# Patient Record
Sex: FEMALE | Race: WHITE | NOT HISPANIC OR LATINO | Employment: OTHER | URBAN - METROPOLITAN AREA
[De-identification: names, ages, dates, MRNs, and addresses within clinical notes are randomized per-mention and may not be internally consistent; named-entity substitution may affect disease eponyms.]

---

## 2022-10-26 ENCOUNTER — OFFICE VISIT (OUTPATIENT)
Dept: PODIATRY | Facility: CLINIC | Age: 77
End: 2022-10-26
Payer: MEDICARE

## 2022-10-26 VITALS
WEIGHT: 130 LBS | SYSTOLIC BLOOD PRESSURE: 150 MMHG | DIASTOLIC BLOOD PRESSURE: 80 MMHG | RESPIRATION RATE: 16 BRPM | BODY MASS INDEX: 25.52 KG/M2 | HEART RATE: 79 BPM | HEIGHT: 60 IN

## 2022-10-26 DIAGNOSIS — M20.11 HALLUX ABDUCTO VALGUS, BILATERAL: Primary | ICD-10-CM

## 2022-10-26 DIAGNOSIS — B35.1 ONYCHOMYCOSIS OF TOENAIL: ICD-10-CM

## 2022-10-26 DIAGNOSIS — E11.9 CONTROLLED TYPE 2 DIABETES MELLITUS WITHOUT COMPLICATION, UNSPECIFIED WHETHER LONG TERM INSULIN USE: ICD-10-CM

## 2022-10-26 DIAGNOSIS — M20.5X9 OVERRIDING TOE, UNSPECIFIED LATERALITY: ICD-10-CM

## 2022-10-26 DIAGNOSIS — M20.12 HALLUX ABDUCTO VALGUS, BILATERAL: Primary | ICD-10-CM

## 2022-10-26 PROCEDURE — 99999 PR PBB SHADOW E&M-NEW PATIENT-LVL V: ICD-10-PCS | Mod: PBBFAC,,, | Performed by: PODIATRIST

## 2022-10-26 PROCEDURE — 99999 PR PBB SHADOW E&M-NEW PATIENT-LVL V: CPT | Mod: PBBFAC,,, | Performed by: PODIATRIST

## 2022-10-26 PROCEDURE — 99202 OFFICE O/P NEW SF 15 MIN: CPT | Mod: S$PBB,,, | Performed by: PODIATRIST

## 2022-10-26 PROCEDURE — 99205 OFFICE O/P NEW HI 60 MIN: CPT | Mod: PBBFAC,PN | Performed by: PODIATRIST

## 2022-10-26 PROCEDURE — 99202 PR OFFICE/OUTPT VISIT, NEW, LEVL II, 15-29 MIN: ICD-10-PCS | Mod: S$PBB,,, | Performed by: PODIATRIST

## 2022-10-26 RX ORDER — AMLODIPINE BESYLATE 5 MG/1
TABLET ORAL
COMMUNITY
Start: 2022-01-18

## 2022-10-26 RX ORDER — ATORVASTATIN CALCIUM 20 MG/1
TABLET, FILM COATED ORAL
COMMUNITY
Start: 2022-01-19

## 2022-10-26 RX ORDER — SUCRALFATE 1 G/1
TABLET ORAL
COMMUNITY
Start: 2022-08-16

## 2022-10-26 RX ORDER — DOXEPIN HYDROCHLORIDE 10 MG/1
10 CAPSULE ORAL
COMMUNITY
Start: 2022-09-15

## 2022-10-26 RX ORDER — ENALAPRIL MALEATE 20 MG/1
TABLET ORAL
COMMUNITY
Start: 2022-08-07

## 2022-10-26 RX ORDER — TRAZODONE HYDROCHLORIDE 50 MG/1
TABLET ORAL
COMMUNITY
Start: 2021-11-29 | End: 2024-03-18

## 2022-10-26 RX ORDER — OMEPRAZOLE 40 MG/1
CAPSULE, DELAYED RELEASE ORAL
COMMUNITY
Start: 2022-08-16

## 2022-10-26 RX ORDER — PROGESTERONE 200 MG/1
CAPSULE ORAL
COMMUNITY
Start: 2022-05-25

## 2022-10-26 RX ORDER — ERGOCALCIFEROL 1.25 MG/1
CAPSULE ORAL
COMMUNITY
Start: 2022-06-20

## 2022-10-26 RX ORDER — ESTRADIOL 1 MG/1
TABLET ORAL
COMMUNITY
Start: 2022-05-25

## 2022-10-26 RX ORDER — QUININE SULFATE 324 MG/1
324 CAPSULE ORAL
COMMUNITY
Start: 2022-04-25 | End: 2023-04-20

## 2022-10-26 RX ORDER — HYOSCYAMINE SULFATE 0.125 MG
TABLET ORAL
COMMUNITY
Start: 2022-08-16

## 2022-10-26 RX ORDER — FAMOTIDINE 40 MG/1
40 TABLET, FILM COATED ORAL
COMMUNITY
Start: 2022-05-31 | End: 2023-05-26

## 2022-10-29 NOTE — PROGRESS NOTES
Subjective:       Patient ID: Dee Main is a 77 y.o. female.    Chief Complaint: Nail Problem, Cyst, and Diabetes Mellitus  Patient presents with complaint of a knot/cyst which hurts in closed toed shoes, problem with great toenail.  Relates area around the big toe joint outside of the left foot, is careful to try to wear shoes to rubbing in these areas.  Relates the end of the right great toenail is lose, lifted up, dark and pinching, no injury.    Past Medical History:   Diagnosis Date    Anxiety     Arthritis     Diabetes mellitus, type 2     GERD (gastroesophageal reflux disease)     Hyperlipidemia     Hypertension     Osteopenia     Restless legs syndrome (RLS)     Vitamin D deficiency      Past Surgical History:   Procedure Laterality Date    BREAST SURGERY      HAND SURGERY Bilateral     HYSTERECTOMY      JOINT REPLACEMENT      KNEE SURGERY Left     NECK SURGERY      SHOULDER SURGERY Bilateral      History reviewed. No pertinent family history.  Social History     Socioeconomic History    Marital status:    Tobacco Use    Smoking status: Former     Types: Cigarettes     Quit date:      Years since quittin.8    Smokeless tobacco: Never   Substance and Sexual Activity    Alcohol use: Not Currently    Drug use: Not Currently    Sexual activity: Not Currently       Current Outpatient Medications   Medication Sig Dispense Refill    amLODIPine (NORVASC) 5 MG tablet   See Instructions, TAKE ONE TABLET BY MOUTH DAILY, # 90 tab, 3 Refill(s), [facility]: [id number], 154.94, cm, 21 13:30:00 CST, Height/Length Measured, 62, kg, 21 13:30:00 CST, Weight Dosing      atorvastatin (LIPITOR) 20 MG tablet   See Instructions, TAKE ONE TABLET BY MOUTH DAILY, # 90 tab, 3 Refill(s), [facility]: [id number], 154.94, cm, 21 13:30:00 CST, Height/Length Measured, 62, kg, 21 13:30:00 CST, Weight Dosing      blood glucose control, high Soln   True Metrix Glucometer, See  Instructions, Test once daily Diagnosis is type II diabetes mellitus, E11.9, # 1 EA, 0 Refill(s), [id number], 154.94, cm, 11/29/21 13:30:00 CST, Height/Length Measured, 62, kg, 11/29/21 13:30:00...      blood sugar diagnostic Strp   True Metrix blood glucose strips, See Instructions, Test 1 time daily Diagnosis is type II diabetes mellitus, E11.9, # 100 EA, 3 Refill(s), [facility]: [id number], 154.94, cm, 08/16/22 14:10:00 CDT, Height/Length Measured, 58.9, kg, 08/16/22 14:1...      doxepin (SINEQUAN) 10 MG capsule 10 mg.      enalapril (VASOTEC) 20 MG tablet   See Instructions, TAKE TWO TABLETS BY MOUTH DAILY, # 180 tab, 3 Refill(s), Maintenance, [facility]: [id number], 154.94, cm, 07/28/22 13:44:00 CDT, Height/Length Measured, 58.4, kg, 07/28/22 13:44:00 CDT, Weight Dosing      ergocalciferol (ERGOCALCIFEROL) 50,000 unit Cap   See Instructions, TAKE ONE CAPSULE BY MOUTH WEEKLY, # 4 cap, 11 Refill(s), [facility]: [id number], 154.94, cm, 06/17/22 13:21:00 CDT, Height/Length Measured, 58.1, kg, 06/17/22 13:21:00 CDT, Weight Dosing      estradioL (ESTRACE) 1 MG tablet   See Instructions, TAKE ONE TABLET UNDER TONGUE AND LET DISSOLVE AT BEDTIME, # 90 tab, 3 Refill(s), [id number], 154.94, cm, 05/25/22 13:09:00 CDT, Height/Length Measured, 59.8, kg, 05/25/22 13:09:00 CDT, Weight Dosing      famotidine (PEPCID) 40 MG tablet 40 mg.      hyoscyamine (ANASPAZ,LEVSIN) 0.125 mg Tab   270 tab, 0 Refill(s), Soft Stop      LANCETS MISC   Lancets, See Instructions, Test once daily Diagnosis is type II diabetes mellitus, E11.9, # 100 EA, 3 Refill(s), Pharmacy: [id number], 154.94, cm, 11/29/21 13:30:00 CST, Height/Length Measured, 62, kg, 11/29/21 13:30:00 CST, Weight D...      omeprazole (PRILOSEC) 40 MG capsule   180 cap, 0 Refill(s), Soft Stop      progesterone (PROMETRIUM) 200 MG capsule   See Instructions, TAKE TWO CAPSULES BY MOUTH DAILY AT BEDTIME, # 180  "cap, 3 Refill(s), Maintenance, [facility]: [id number], 154.94, cm, 05/25/22 13:09:00 CDT, Height/Length Measured, 59.8, kg, 05/25/22 13:09:00 CDT, Weight Dosing      quiNINE sulfate 324 mg Cap 324 mg.      sucralfate (CARAFATE) 1 gram tablet   120 tab, 0 Refill(s), Soft Stop      traZODone (DESYREL) 50 MG tablet   See Instructions, TAKE ONE OR TWO TABLETS BY MOUTH AT BEDTIME AS NEEDED, # 60 tab, 11 Refill(s), [facility]: [id number], 154.94, cm, 11/29/21 13:30:00 CST, Height/Length Measured, 62, kg, 11/29/21 13:30:00 CST, Weight Dosing       No current facility-administered medications for this visit.     Review of patient's allergies indicates:   Allergen Reactions    Promethazine      Other reaction(s): Seizure    Hydrocodone      Other reaction(s): "I get loopy, crazy", Nausea       Review of Systems   HENT:  Negative for congestion.    Respiratory:  Negative for cough and shortness of breath.    Cardiovascular:  Negative for leg swelling.   All other systems reviewed and are negative.    Objective:      Vitals:    10/26/22 1026   BP: (!) 150/80   Pulse: 79   Resp: 16   Weight: 59 kg (130 lb)   Height: 5' (1.524 m)     Physical Exam  Vitals and nursing note reviewed.   Constitutional:       General: She is not in acute distress.     Appearance: Normal appearance.   Cardiovascular:      Pulses:           Dorsalis pedis pulses are 2+ on the right side and 2+ on the left side.        Posterior tibial pulses are 1+ on the right side and 1+ on the left side.   Pulmonary:      Effort: Pulmonary effort is normal.   Musculoskeletal:      Right foot: Deformity (Slightly prominent styloid process left, no pain erythema or edema at this time.  Mildly elevated 2nd digits bilateral) and bunion present.      Left foot: Bunion present.   Feet:      Right foot:      Skin integrity: No erythema.      Left foot:      Skin integrity: No erythema.      Toenail Condition: Fungal disease present.  Skin:     Capillary Refill: " Capillary refill takes less than 2 seconds.   Neurological:      General: No focal deficit present.      Mental Status: She is alert.   Psychiatric:         Mood and Affect: Mood normal.         Behavior: Behavior normal.                                    Assessment:       1. Hallux abducto valgus, bilateral    2. Overriding toe, unspecified laterality    3. Controlled type 2 diabetes mellitus without complication, unspecified whether long term insulin use    4. Onychomycosis of toenail - Left Foot        Plan:         Reviewed bunions with patient, not/cyst in this area the left foot is arthritis which is also present on the right foot just not as notable.  We also discussed inflammation on the side of the styloid process left foot.  Advised patient this is inflammation related and we discussed mild bursa or fluid/inflammation which can develop in the area due to pressure of shoes.  Explained both of these conditions along with bunions, overriding 2nd digits and arthritis in her feet require a wide tennis shoe with good support, thick sole for shock absorption at all times.  Recommended [place].  We discussed appropriate shoes indoors  Reviewed over-the-counter topical anti-inflammatory Voltaren gel as directed for any/all areas pertaining to inflammation in her feet or as directed for arthritis pain  Had a lengthy discussion regarding nail left great toe, shape and damage to the nail due to fungal involvement.  Nail was debrided in thickness reduced, instructed patient to file the nail weekly.  We discussed care and maintenance of this area, treatment for fungus and how to clean under the nail properly.  Debridement of the nail today exposed some damage to the nail bed.  We discussed soaking regimens, daily use of Vicks vapor rub or tea tree oil.  Advised patient if there is permanent damage the nail may not be able to reattached, topical treatments are most effective if utilized daily, takes about 3 months  We did  discuss diabetic foot care, patient is currently on no medications, denies burning or tingling in feet  Patient was in understanding and agreement with treatment plan.  I counseled the patient on their conditions, implications and medical management.  Instructed patient/family to contact the office with any changes, questions, concerns, worsening of symptoms.   Total face to face time, exam, assessment, treatment, discussion, documentation 20 minutes, more than half this time spent on consultation and coordination of care.   Follow up as needed    This note was created using M*Modal voice recognition software that occasionally misinterpreted phrases or words.

## 2024-03-18 ENCOUNTER — OFFICE VISIT (OUTPATIENT)
Dept: PODIATRY | Facility: CLINIC | Age: 79
End: 2024-03-18
Payer: MEDICARE

## 2024-03-18 VITALS
HEIGHT: 60 IN | WEIGHT: 141.13 LBS | BODY MASS INDEX: 27.71 KG/M2 | SYSTOLIC BLOOD PRESSURE: 154 MMHG | DIASTOLIC BLOOD PRESSURE: 70 MMHG | RESPIRATION RATE: 16 BRPM | HEART RATE: 68 BPM

## 2024-03-18 DIAGNOSIS — M20.11 HALLUX ABDUCTO VALGUS, BILATERAL: ICD-10-CM

## 2024-03-18 DIAGNOSIS — B35.1 ONYCHOMYCOSIS OF TOENAIL: ICD-10-CM

## 2024-03-18 DIAGNOSIS — L60.0 INGROWN NAIL OF SECOND TOE OF LEFT FOOT: ICD-10-CM

## 2024-03-18 DIAGNOSIS — M20.12 HALLUX ABDUCTO VALGUS, BILATERAL: ICD-10-CM

## 2024-03-18 DIAGNOSIS — M20.5X9 OVERRIDING TOE, UNSPECIFIED LATERALITY: ICD-10-CM

## 2024-03-18 DIAGNOSIS — E11.9 COMPREHENSIVE DIABETIC FOOT EXAMINATION, TYPE 2 DM, ENCOUNTER FOR: Primary | ICD-10-CM

## 2024-03-18 DIAGNOSIS — E11.9 CONTROLLED TYPE 2 DIABETES MELLITUS WITHOUT COMPLICATION, UNSPECIFIED WHETHER LONG TERM INSULIN USE: ICD-10-CM

## 2024-03-18 DIAGNOSIS — L60.0 INGROWN NAIL OF GREAT TOE OF LEFT FOOT: ICD-10-CM

## 2024-03-18 PROCEDURE — 99215 OFFICE O/P EST HI 40 MIN: CPT | Mod: PBBFAC,PN | Performed by: PODIATRIST

## 2024-03-18 PROCEDURE — 99999 PR PBB SHADOW E&M-EST. PATIENT-LVL V: CPT | Mod: PBBFAC,,, | Performed by: PODIATRIST

## 2024-03-18 PROCEDURE — 99214 OFFICE O/P EST MOD 30 MIN: CPT | Mod: S$PBB,,, | Performed by: PODIATRIST

## 2024-03-18 RX ORDER — LUBIPROSTONE 24 UG/1
24 CAPSULE ORAL 2 TIMES DAILY
COMMUNITY
Start: 2024-01-17

## 2024-03-18 RX ORDER — ASPIRIN 325 MG
50000 TABLET, DELAYED RELEASE (ENTERIC COATED) ORAL
COMMUNITY
Start: 2023-11-04

## 2024-03-18 RX ORDER — TRIAMCINOLONE ACETONIDE 1 MG/G
CREAM TOPICAL 2 TIMES DAILY
COMMUNITY
Start: 2024-01-08

## 2024-03-18 RX ORDER — TRAZODONE HYDROCHLORIDE 150 MG/1
150 TABLET ORAL NIGHTLY
COMMUNITY
Start: 2023-11-30 | End: 2024-03-18

## 2024-03-18 RX ORDER — PIOGLITAZONEHYDROCHLORIDE 15 MG/1
15 TABLET ORAL
COMMUNITY
Start: 2023-11-30

## 2024-03-18 RX ORDER — METOCLOPRAMIDE 10 MG/1
10 TABLET ORAL 2 TIMES DAILY
COMMUNITY
Start: 2024-01-29

## 2024-03-18 RX ORDER — MONTELUKAST SODIUM 10 MG/1
10 TABLET ORAL NIGHTLY
COMMUNITY
Start: 2024-01-08

## 2024-03-18 RX ORDER — EZETIMIBE 10 MG/1
10 TABLET ORAL
COMMUNITY
Start: 2024-03-07

## 2024-03-18 RX ORDER — ESTRADIOL 0.1 MG/G
CREAM VAGINAL
COMMUNITY
Start: 2023-12-18

## 2024-03-18 RX ORDER — TRAZODONE HYDROCHLORIDE 100 MG/1
100 TABLET ORAL NIGHTLY
COMMUNITY
Start: 2024-01-23

## 2024-03-18 NOTE — PROGRESS NOTES
Subjective:       Patient ID: Dee Main is a 78 y.o. female.    Chief Complaint: Toe Pain, Ingrown Toenail, Nail Problem, and Diabetic Foot Exam  Patient presents with complaint of pain 1st and 2nd digits left foot due to ingrown nail/nail problem.  Relates these toes hurt all the time.  She has been going for pedicures on a regular basis which has helped but now when there is pressure on these nails it is much worse.  Denies redness or drainage.  Relates problems with the left great toenail for a while, stays dark and lifted up at the end, history of old injury to this toenail  Pain level 4/10 with pressure  Is overdue for annual diabetic foot exam.  History type 2 diabetes without complication      Past Medical History:   Diagnosis Date    Anxiety     Arthritis     Diabetes mellitus, type 2     GERD (gastroesophageal reflux disease)     Hyperlipidemia     Hypertension     Osteopenia     Restless legs syndrome (RLS)     Vitamin D deficiency      Past Surgical History:   Procedure Laterality Date    BREAST SURGERY      HAND SURGERY Bilateral     HYSTERECTOMY      JOINT REPLACEMENT      KNEE SURGERY Left     NECK SURGERY      SHOULDER SURGERY Bilateral      History reviewed. No pertinent family history.  Social History     Socioeconomic History    Marital status:    Tobacco Use    Smoking status: Former     Current packs/day: 0.00     Types: Cigarettes     Quit date:      Years since quittin.2    Smokeless tobacco: Never   Substance and Sexual Activity    Alcohol use: Not Currently    Drug use: Not Currently    Sexual activity: Not Currently       Current Outpatient Medications   Medication Sig Dispense Refill    amLODIPine (NORVASC) 5 MG tablet   See Instructions, TAKE ONE TABLET BY MOUTH DAILY, # 90 tab, 3 Refill(s), [facility]: [id number], 154.94, cm, 21 13:30:00 CST, Height/Length Measured, 62, kg, 21 13:30:00 CST, Weight Dosing      atorvastatin (LIPITOR) 20 MG tablet    See Instructions, TAKE ONE TABLET BY MOUTH DAILY, # 90 tab, 3 Refill(s), [facility]: [id number], 154.94, cm, 11/29/21 13:30:00 CST, Height/Length Measured, 62, kg, 11/29/21 13:30:00 CST, Weight Dosing      blood glucose control, high Soln   True Metrix Glucometer, See Instructions, Test once daily Diagnosis is type II diabetes mellitus, E11.9, # 1 EA, 0 Refill(s), [id number], 154.94, cm, 11/29/21 13:30:00 CST, Height/Length Measured, 62, kg, 11/29/21 13:30:00...      blood sugar diagnostic Strp   True Metrix blood glucose strips, See Instructions, Test 1 time daily Diagnosis is type II diabetes mellitus, E11.9, # 100 EA, 3 Refill(s), [facility]: [id number], 154.94, cm, 08/16/22 14:10:00 CDT, Height/Length Measured, 58.9, kg, 08/16/22 14:1...      cholecalciferol, vitamin D3, 1,250 mcg (50,000 unit) capsule Take 50,000 Units by mouth every 7 days.      CMPD testosterone proprionate 2% in vanicream See Instructions, 2mg to skin of wrists daily, rub until dry. Dispense 90 day supply., # 3 EA, 1 Refill(s), Maintenance, Pharmacy: [facility], 154.94, cm, 05/25/22 13:09:00 CDT, Height/Length Measured, 59.8, kg, 05/25/22 13:09:00 CDT, Weight Dosing      doxepin (SINEQUAN) 10 MG capsule 10 mg.      enalapril (VASOTEC) 20 MG tablet   See Instructions, TAKE TWO TABLETS BY MOUTH DAILY, # 180 tab, 3 Refill(s), Maintenance, [facility]: [id number], 154.94, cm, 07/28/22 13:44:00 CDT, Height/Length Measured, 58.4, kg, 07/28/22 13:44:00 CDT, Weight Dosing      ergocalciferol (ERGOCALCIFEROL) 50,000 unit Cap   See Instructions, TAKE ONE CAPSULE BY MOUTH WEEKLY, # 4 cap, 11 Refill(s), [facility]: [id number], 154.94, cm, 06/17/22 13:21:00 CDT, Height/Length Measured, 58.1, kg, 06/17/22 13:21:00 CDT, Weight Dosing      estradioL (ESTRACE) 0.01 % (0.1 mg/gram) vaginal cream Place vaginally.      estradioL (ESTRACE) 1 MG tablet   See Instructions, TAKE ONE TABLET UNDER TONGUE AND LET DISSOLVE  "AT BEDTIME, # 90 tab, 3 Refill(s), [facility]: [id number], 154.94, cm, 05/25/22 13:09:00 CDT, Height/Length Measured, 59.8, kg, 05/25/22 13:09:00 CDT, Weight Dosing      ezetimibe (ZETIA) 10 mg tablet Take 10 mg by mouth.      hyoscyamine (ANASPAZ,LEVSIN) 0.125 mg Tab   270 tab, 0 Refill(s), Soft Stop      LANCETS MISC   Lancets, See Instructions, Test once daily Diagnosis is type II diabetes mellitus, E11.9, # 100 EA, 3 Refill(s), Pharmacy: [facility] #[id number], 154.94, cm, 11/29/21 13:30:00 CST, Height/Length Measured, 62, kg, 11/29/21 13:30:00 CST, Weight D...      lubiprostone (AMITIZA) 24 MCG Cap Take 24 mcg by mouth 2 (two) times daily.      metoclopramide HCl (REGLAN) 10 MG tablet Take 10 mg by mouth 2 (two) times daily.      montelukast (SINGULAIR) 10 mg tablet Take 10 mg by mouth every evening.      omeprazole (PRILOSEC) 40 MG capsule   180 cap, 0 Refill(s), Soft Stop      progesterone (PROMETRIUM) 200 MG capsule   See Instructions, TAKE TWO CAPSULES BY MOUTH DAILY AT BEDTIME, # 180 cap, 3 Refill(s), Maintenance, [facility]: [id number], 154.94, cm, 05/25/22 13:09:00 CDT, Height/Length Measured, 59.8, kg, 05/25/22 13:09:00 CDT, Weight Dosing      sucralfate (CARAFATE) 1 gram tablet   120 tab, 0 Refill(s), Soft Stop      traZODone (DESYREL) 100 MG tablet Take 100 mg by mouth every evening.      triamcinolone acetonide 0.1% (KENALOG) 0.1 % cream Apply topically 2 (two) times daily.      famotidine (PEPCID) 40 MG tablet 40 mg.      pioglitazone (ACTOS) 15 MG tablet Take 15 mg by mouth.       No current facility-administered medications for this visit.     Review of patient's allergies indicates:   Allergen Reactions    Promethazine      Other reaction(s): Seizure    Hydrocodone      Other reaction(s): "I get loopy, crazy", Nausea       Review of Systems   Cardiovascular:  Negative for leg swelling.   Musculoskeletal:  Negative for gait problem.   All other systems reviewed and are negative.    "   Objective:      Vitals:    03/18/24 1128   BP: (!) 154/70   Pulse: 68   Resp: 16   Weight: 64 kg (141 lb 1.6 oz)   Height: 5' (1.524 m)     Physical Exam  Vitals and nursing note reviewed.   Constitutional:       General: She is not in acute distress.     Appearance: Normal appearance.   Cardiovascular:      Pulses:           Dorsalis pedis pulses are 2+ on the right side and 2+ on the left side.        Posterior tibial pulses are 1+ on the right side and 1+ on the left side.   Pulmonary:      Effort: Pulmonary effort is normal.   Musculoskeletal:      Right foot: Deformity (HAV, slightly prominent styloid process left, elevated 2nd digits bilateral) and bunion present.      Left foot: Deformity and bunion present.   Feet:      Right foot:      Protective Sensation: 5 sites tested.  5 sites sensed.      Skin integrity: No erythema (mild superficial white fungus nails right foot).      Toenail Condition: Fungal disease present.     Left foot:      Protective Sensation: 5 sites tested.  5 sites sensed.      Skin integrity: No erythema (pain, ingrown nail lateral > medial left hallux and medial 2nd digit left, all areas of infection).      Toenail Condition: Left toenails are abnormally thick and ingrown. Fungal disease present.  Skin:     Capillary Refill: Capillary refill takes less than 2 seconds.   Neurological:      General: No focal deficit present.      Mental Status: She is alert.   Psychiatric:         Mood and Affect: Mood normal.         Behavior: Behavior normal.                    Assessment:       1. Comprehensive diabetic foot examination, type 2 DM, encounter for    2. Controlled type 2 diabetes mellitus without complication, unspecified whether long term insulin use    3. Hallux abducto valgus, bilateral    4. Ingrown nail of great toe of left foot    5. Ingrown nail of second toe of left foot    6. Overriding toe, unspecified laterality    7. Onychomycosis of toenail - Left Foot          Plan:            Comprehensive diabetic pedal exam performed  Good sensation in feet    Reviewed diabetic education  Reviewed benefit of controled glucose/diabetes regarding potential foot complications.    Reviewed appropriate shoes,  especially indoors to protect feet, no flat shoes, slippers or walking in sock or bare feet.    Discussed maintenance of skin and nails and potential complications.    Advised patient the left great toenail has gotten a lot worse, fungal involvement has caused the nail to not only lift up but fungus incorporates the entire toenail.  Explained she is at risk for the whole toenail coming off.  It is currently ingrown on the lateral aspect and incurvated on the medial aspect without infection  We discussed ingrown nail medial aspect of the 2nd digit nail, the nail is irregular, tented, there is most likely pressure between the big toe on the 2nd digits with bunions and elevated/overlapping 2nd digit since these are the 2 sides that are most ingrown  We reviewed care and maintenance of nail to try to prevent recurrence  Discussed use of toe spacer to keep these toes apart  Ingrown nail removed/debrided lateral > medial left hallux and medial 2nd digit left. No bleeding or drainage.  Thickness reduced left hallux nail. Triple antibiotic ointment, gauze, coban applied. Patient is not a candidate for nail avulsion at this time.    Instructed on care, if sore apply small amount of antibiotic ointment and soft dressing during the day, soak in warm water and Epsom salts in the evening.  Instructed to perform daily until any remaining discomfort has resolved in a few days, then leave the nail polish off and start applying Vicks vapor rub twice daily, this is recommended on all toenails for a few months.  If she needs to polish for a special occasion remove in a few days and resume topical treatment  Reviewed need for daily foot checks and instructed patient to contact the office with any area of redness or  swelling which has not improved in a few days.  Patient was in understanding and agreement with treatment plan.  I counseled the patient on their conditions, implications and medical management.  Instructed patient to contact the office with any changes, questions, concerns, worsening of symptoms.   Total face to face time 30 minutes, exam, assessment, treatment, discussion, additional time for review of chart prior to and following appointment and visit documentation, consultation and coordination of care.    Follow up as needed    This note was created using M*Modal voice recognition software that occasionally misinterpreted phrases or words.